# Patient Record
Sex: MALE | Race: WHITE | NOT HISPANIC OR LATINO | URBAN - METROPOLITAN AREA
[De-identification: names, ages, dates, MRNs, and addresses within clinical notes are randomized per-mention and may not be internally consistent; named-entity substitution may affect disease eponyms.]

---

## 2023-08-31 ENCOUNTER — OFFICE VISIT (OUTPATIENT)
Dept: FAMILY MEDICINE | Facility: CLINIC | Age: 43
End: 2023-08-31
Payer: COMMERCIAL

## 2023-08-31 VITALS
DIASTOLIC BLOOD PRESSURE: 58 MMHG | BODY MASS INDEX: 40.66 KG/M2 | OXYGEN SATURATION: 96 % | HEIGHT: 70 IN | RESPIRATION RATE: 16 BRPM | WEIGHT: 284 LBS | HEART RATE: 113 BPM | TEMPERATURE: 98 F | SYSTOLIC BLOOD PRESSURE: 94 MMHG

## 2023-08-31 DIAGNOSIS — R42 LIGHTHEADEDNESS: Primary | ICD-10-CM

## 2023-08-31 PROCEDURE — 99203 OFFICE O/P NEW LOW 30 MIN: CPT | Mod: 25

## 2023-08-31 PROCEDURE — 93000 ELECTROCARDIOGRAM COMPLETE: CPT

## 2023-08-31 RX ORDER — LISINOPRIL/HYDROCHLOROTHIAZIDE 10-12.5 MG
1 TABLET ORAL DAILY
COMMUNITY
Start: 2023-04-18

## 2023-08-31 NOTE — PATIENT INSTRUCTIONS
Hold your BP med tomorrow  Call your PCP back in [place]   EKG looked good today.  Drink more water and eat lighter meals  If symptoms return you will need to call 911 and go to the ER

## 2023-08-31 NOTE — PROGRESS NOTES
"  Assessment & Plan     Lightheadedness  Recommend holding BP medication for 24 hours and contacting PCP back home.   EKG with no acute findings. Could have some mild dehydration.   - EKG 12-lead complete w/read - Clinics  At this point he is feeling better and does not want to go to the ER.   We discussed s/s of when to seek treatment in the ER    30 minutes spent by me on the date of the encounter doing review of test results, patient visit, and documentation        BMI:   Estimated body mass index is 40.75 kg/m  as calculated from the following:    Height as of this encounter: 1.778 m (5' 10\").    Weight as of this encounter: 128.8 kg (284 lb).       Patient Instructions   Hold your BP med tomorrow  Call your PCP back in [place]   EKG looked good today.  Drink more water and eat lighter meals  If symptoms return you will need to call 911 and go to the ER    No follow-ups on file.    [facility]    Subha Aelxis is a 42 year old, presenting for the following health issues:  Clammy (Feeling faint and clammy and abdominal pain and dizzy. Just started. Not a lot of sleep. Since started has gotten better. )      HPI     Presents with lightheadedness that started approximately 15 minutes ago while playing mini golf at the [employer]. States he had a hamburger 30 minutes prior to feeling ill and felt some discomfort in his abdomen after eating. Had some chest discomfort that is now gone like he had to belch.   Has hx of HTN. Denies hx of T2DM, CVD. Dad had a CVA at age 78. He is obese.   Is traveling from [place] with his wife.   He did take his BP medication today and has been on this since November 2022. He does not check his BP at home and avoids healthcare settings in general.        Review of Systems   Constitutional, HEENT, cardiovascular, pulmonary, GI, , musculoskeletal, neuro, skin, endocrine and psych systems are negative, " "except as otherwise noted.      Objective    Pulse 113   Temp 98  F (36.7  C) (Tympanic)   Resp 16   Ht 1.778 m (5' 10\")   Wt 128.8 kg (284 lb)   SpO2 96%   BMI 40.75 kg/m    Body mass index is 40.75 kg/m .  Physical Exam   GENERAL: healthy, alert and no distress  EYES: Eyes grossly normal to inspection, PERRL and conjunctivae and sclerae normal  HENT: ear canals and TM's normal, nose and mouth without ulcers or lesions  RESP: lungs clear to auscultation - no rales, rhonchi or wheezes  CV: regular rate and rhythm, normal S1 S2, no S3 or S4, no murmur, click or rub, no peripheral edema and peripheral pulses strong  ABDOMEN: soft, nontender, no hepatosplenomegaly, no masses and bowel sounds normal  NEURO: Normal strength and tone, mentation intact and speech normal    EKG - Reviewed and interpreted by me appears normal, NSR, normal axis, normal intervals, no acute ST/T changes c/w ischemia, no LVH by voltage criteria, there are no prior tracings available                "